# Patient Record
Sex: OTHER/UNKNOWN | URBAN - METROPOLITAN AREA
[De-identification: names, ages, dates, MRNs, and addresses within clinical notes are randomized per-mention and may not be internally consistent; named-entity substitution may affect disease eponyms.]

---

## 2019-05-13 ENCOUNTER — IMPORTED ENCOUNTER (OUTPATIENT)
Dept: URBAN - METROPOLITAN AREA CLINIC 1 | Facility: CLINIC | Age: 29
End: 2019-05-13

## 2020-02-25 NOTE — PATIENT DISCUSSION
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

## 2020-05-20 NOTE — PATIENT DISCUSSION
Resume normal activity. Resume any medications that were discontinued for surgery. Sutures removed without difficulty. Artificial tears prn burning/itching/blurry vision. Wash incisions/ lash line once daily with baby shampoo. Discussed skin care and sun avoidance at length. Sunscreen given.

## 2020-05-20 NOTE — PATIENT DISCUSSION
Patient presents w/ some chemosis bilaterally. Prescribed lotemax eye drops once daily for 5 days. Recommend AT drops and gels daily.   Follow up Friday for suture removal.

## 2020-05-22 NOTE — PATIENT DISCUSSION
Resume normal activity. Resume any medications that were discontinued for surgery. Sutures removed without difficulty. Artificial tears prn burning/itching/blurry vision. Wash incisions/ lash line once daily with baby shampoo. Discussed skin care and sun avoidance at length. Follow up in one month.

## 2021-03-25 NOTE — PATIENT DISCUSSION
[id number]/30 -2&nbsp;SN &nbsp; &nbsp; lcs

## 2021-09-28 NOTE — PATIENT DISCUSSION
[facility]28/2021[facility][id number]/25 -2&nbsp;SN &nbsp; &nbsp; lcs

## 2022-04-02 ASSESSMENT — VISUAL ACUITY
OD_CC: J1+
OD_SC: 20/30-2
OD_CC: 20/200
OS_CC: J1+
OS_CC: 20/200-1
OS_SC: 20/30

## 2022-04-02 ASSESSMENT — KERATOMETRY
OD_K1POWER_DIOPTERS: 43.25
OS_AXISANGLE2_DEGREES: 096
OS_K1POWER_DIOPTERS: 43.25
OD_K2POWER_DIOPTERS: 43.75
OS_K2POWER_DIOPTERS: 44.25
OD_AXISANGLE2_DEGREES: 057

## 2022-04-02 ASSESSMENT — TONOMETRY
OS_IOP_MMHG: 17
OD_IOP_MMHG: 17